# Patient Record
Sex: MALE | Race: ASIAN | NOT HISPANIC OR LATINO | URBAN - METROPOLITAN AREA
[De-identification: names, ages, dates, MRNs, and addresses within clinical notes are randomized per-mention and may not be internally consistent; named-entity substitution may affect disease eponyms.]

---
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
## 2024-08-23 ENCOUNTER — APPOINTMENT (OUTPATIENT)
Dept: PRIMARY CARE | Facility: CLINIC | Age: 19
End: 2024-08-23
Payer: COMMERCIAL

## 2024-08-23 VITALS
HEIGHT: 69 IN | SYSTOLIC BLOOD PRESSURE: 121 MMHG | WEIGHT: 145 LBS | HEART RATE: 80 BPM | DIASTOLIC BLOOD PRESSURE: 84 MMHG | BODY MASS INDEX: 21.48 KG/M2

## 2024-08-23 DIAGNOSIS — Z00.00 WELL ADULT HEALTH CHECK: ICD-10-CM

## 2024-08-23 DIAGNOSIS — F41.9 ANXIETY: ICD-10-CM

## 2024-08-23 DIAGNOSIS — Z23 NEED FOR INFLUENZA VACCINATION: ICD-10-CM

## 2024-08-23 DIAGNOSIS — R19.7 DIARRHEA, UNSPECIFIED TYPE: ICD-10-CM

## 2024-08-23 DIAGNOSIS — R14.0 POSTPRANDIAL ABDOMINAL BLOATING: Primary | ICD-10-CM

## 2024-08-23 PROBLEM — J02.0 STREP PHARYNGITIS: Status: ACTIVE | Noted: 2024-08-23

## 2024-08-23 PROBLEM — K30 GASTROINTESTINAL DISCOMFORT: Status: ACTIVE | Noted: 2022-08-16

## 2024-08-23 PROBLEM — R50.9 FEVER: Status: ACTIVE | Noted: 2024-08-23

## 2024-08-23 PROCEDURE — 3008F BODY MASS INDEX DOCD: CPT | Performed by: INTERNAL MEDICINE

## 2024-08-23 PROCEDURE — 99203 OFFICE O/P NEW LOW 30 MIN: CPT | Performed by: INTERNAL MEDICINE

## 2024-08-23 PROCEDURE — 1036F TOBACCO NON-USER: CPT | Performed by: INTERNAL MEDICINE

## 2024-08-23 RX ORDER — PANTOPRAZOLE SODIUM 40 MG/1
40 TABLET, DELAYED RELEASE ORAL DAILY
Qty: 60 TABLET | Refills: 0 | Status: SHIPPED | OUTPATIENT
Start: 2024-08-23

## 2024-08-23 ASSESSMENT — PATIENT HEALTH QUESTIONNAIRE - PHQ9
1. LITTLE INTEREST OR PLEASURE IN DOING THINGS: NOT AT ALL
SUM OF ALL RESPONSES TO PHQ9 QUESTIONS 1 AND 2: 0
2. FEELING DOWN, DEPRESSED OR HOPELESS: NOT AT ALL

## 2024-08-23 NOTE — PROGRESS NOTES
"Assessment/Plan   Problem List Items Addressed This Visit       Postprandial abdominal bloating - Primary    Relevant Medications    pantoprazole (ProtoNix) 40 mg EC tablet    Other Relevant Orders    CBC    Comprehensive Metabolic Panel    TSH with reflex to Free T4 if abnormal    Stool Pathogen Panel, PCR    C. difficile, PCR    H. pylori antigen, stool    Diarrhea    Anxiety     Other Visit Diagnoses       Well adult health check        Relevant Orders    CBC    Comprehensive Metabolic Panel        He wanted flu shot that will be given  We discussed optimal time in September   Labs as ordered  Stool test  Use Protonix and see how he responds  And then advise subsequently    Subjective   Patient ID: Wily Castaneda is a 19 y.o. male who presents for Establish Care (Patient states that he has been having stomach issues for about 1-2 years.  States that he thinks that it is IBS that comes and goes.  Stomach issues occur after eating at time and feels bloated with \"mushy bowels\".  ).    Past Surgical History:   Procedure Laterality Date    FRACTURE SURGERY  2014    HAND SURGERY Left       Family History   Problem Relation Name Age of Onset    Hypertension Father Teo Castaneda     Diabetes Paternal Grandfather Anuel Castaneda       Social History     Socioeconomic History    Marital status: Single     Spouse name: Not on file    Number of children: Not on file    Years of education: Not on file    Highest education level: Not on file   Occupational History    Not on file   Tobacco Use    Smoking status: Never    Smokeless tobacco: Never   Substance and Sexual Activity    Alcohol use: Never    Drug use: Never    Sexual activity: Never   Other Topics Concern    Not on file   Social History Narrative    Not on file     Social Determinants of Health     Financial Resource Strain: Not on file   Food Insecurity: Not on file   Transportation Needs: Not on file   Physical Activity: Not on file   Stress: Not on file   Social Connections: Not " "on file   Intimate Partner Violence: Not on file   Housing Stability: Not on file      Patient has no known allergies.   Current Outpatient Medications   Medication Sig Dispense Refill    pantoprazole (ProtoNix) 40 mg EC tablet Take 1 tablet (40 mg) by mouth once daily. Do not crush, chew, or split. 60 tablet 0     No current facility-administered medications for this visit.      Vitals:    08/23/24 1143   BP: 121/84   BP Location: Left arm   Patient Position: Sitting   Pulse: 80   Weight: 65.8 kg (145 lb)   Height: 1.753 m (5' 9\")      Problem List Items Addressed This Visit       Postprandial abdominal bloating - Primary    Relevant Medications    pantoprazole (ProtoNix) 40 mg EC tablet    Other Relevant Orders    CBC    Comprehensive Metabolic Panel    TSH with reflex to Free T4 if abnormal    Stool Pathogen Panel, PCR    C. difficile, PCR    H. pylori antigen, stool    Diarrhea    Anxiety     Other Visit Diagnoses       Well adult health check        Relevant Orders    CBC    Comprehensive Metabolic Panel           Orders Placed This Encounter   Procedures    Stool Pathogen Panel, PCR     Order Specific Question:   Release result to MyChart     Answer:   Immediate [1]    C. difficile, PCR     Order Specific Question:   Release result to MyChart     Answer:   Immediate [1]    CBC     Standing Status:   Future     Standing Expiration Date:   8/23/2025     Order Specific Question:   Release result to MyChart     Answer:   Immediate    Comprehensive Metabolic Panel     Standing Status:   Future     Standing Expiration Date:   8/23/2025     Order Specific Question:   Release result to MyChart     Answer:   Immediate    TSH with reflex to Free T4 if abnormal     Standing Status:   Future     Standing Expiration Date:   8/23/2025     Order Specific Question:   Release result to MyChart     Answer:   Immediate    H. pylori antigen, stool     Standing Status:   Future     Standing Expiration Date:   8/23/2025     Order " "Specific Question:   Release result to [facility]     Answer:   Immediate [1]        HPI  This is a very [place] and 19-year-old young male who is currently studying computer science and math at [employer]  He has been having the feeling of anxiety while flying  Has had bowel upset for last 2 to 3 years  He has postprandial bloating he has  Mushy stool and diarrhea-like features intermittently he has not lost weight there is no palpitation  He does not take nonsteroidal anti-inflammatory drug    ROS otherwise has been negative  Past medical history nonsignificant  Social history as recorded  Family history noncontributory  Allergies and medication as recorded    PHYSICAL EXAM  Cardiovascular chest abdominal neurological examination normal      No results found for: \"PR1\", \"BMPR1A\", \"CMPLAS\", \"GC5VKFRR\", \"KPSAT\"   No results found for: \"CHOL\", \"LDLCALC\", \"HDLC\", \"LCTRG\", \"CHHDL\"             "

## 2024-08-26 ENCOUNTER — LAB (OUTPATIENT)
Dept: LAB | Facility: LAB | Age: 19
End: 2024-08-26
Payer: COMMERCIAL

## 2024-08-26 DIAGNOSIS — Z00.00 WELL ADULT HEALTH CHECK: ICD-10-CM

## 2024-08-26 DIAGNOSIS — R14.0 POSTPRANDIAL ABDOMINAL BLOATING: ICD-10-CM

## 2024-08-26 LAB
ALBUMIN SERPL BCP-MCNC: 4.3 G/DL (ref 3.4–5)
ALP SERPL-CCNC: 69 U/L (ref 33–120)
ALT SERPL W P-5'-P-CCNC: 11 U/L (ref 10–52)
ANION GAP SERPL CALC-SCNC: 12 MMOL/L (ref 10–20)
AST SERPL W P-5'-P-CCNC: 16 U/L (ref 9–39)
BILIRUB SERPL-MCNC: 0.9 MG/DL (ref 0–1.2)
BUN SERPL-MCNC: 11 MG/DL (ref 6–23)
CALCIUM SERPL-MCNC: 9.2 MG/DL (ref 8.6–10.3)
CHLORIDE SERPL-SCNC: 104 MMOL/L (ref 98–107)
CO2 SERPL-SCNC: 29 MMOL/L (ref 21–32)
CREAT SERPL-MCNC: 1 MG/DL (ref 0.5–1.3)
EGFRCR SERPLBLD CKD-EPI 2021: >90 ML/MIN/1.73M*2
ERYTHROCYTE [DISTWIDTH] IN BLOOD BY AUTOMATED COUNT: 12.2 % (ref 11.5–14.5)
GLUCOSE SERPL-MCNC: 74 MG/DL (ref 74–99)
HCT VFR BLD AUTO: 45.4 % (ref 41–52)
HGB BLD-MCNC: 15 G/DL (ref 13.5–17.5)
MCH RBC QN AUTO: 31.6 PG (ref 26–34)
MCHC RBC AUTO-ENTMCNC: 33 G/DL (ref 32–36)
MCV RBC AUTO: 96 FL (ref 80–100)
NRBC BLD-RTO: 0 /100 WBCS (ref 0–0)
PLATELET # BLD AUTO: 230 X10*3/UL (ref 150–450)
POTASSIUM SERPL-SCNC: 3.9 MMOL/L (ref 3.5–5.3)
PROT SERPL-MCNC: 6.6 G/DL (ref 6.4–8.2)
RBC # BLD AUTO: 4.75 X10*6/UL (ref 4.5–5.9)
SODIUM SERPL-SCNC: 141 MMOL/L (ref 136–145)
TSH SERPL-ACNC: 3.14 MIU/L (ref 0.44–3.98)
WBC # BLD AUTO: 9.4 X10*3/UL (ref 4.4–11.3)

## 2024-08-26 PROCEDURE — 84443 ASSAY THYROID STIM HORMONE: CPT

## 2024-08-26 PROCEDURE — 36415 COLL VENOUS BLD VENIPUNCTURE: CPT

## 2024-08-26 PROCEDURE — 80053 COMPREHEN METABOLIC PANEL: CPT

## 2024-08-26 PROCEDURE — 87449 NOS EACH ORGANISM AG IA: CPT

## 2024-08-26 PROCEDURE — 87506 IADNA-DNA/RNA PROBE TQ 6-11: CPT

## 2024-08-26 PROCEDURE — 85027 COMPLETE CBC AUTOMATED: CPT

## 2024-08-28 LAB
C COLI+JEJ+UPSA DNA STL QL NAA+PROBE: NOT DETECTED
EC STX1 GENE STL QL NAA+PROBE: NOT DETECTED
EC STX2 GENE STL QL NAA+PROBE: NOT DETECTED
H PYLORI AG STL QL IA: NEGATIVE
NOROVIRUS GI + GII RNA STL NAA+PROBE: NOT DETECTED
RV RNA STL NAA+PROBE: NOT DETECTED
SALMONELLA DNA STL QL NAA+PROBE: NOT DETECTED
SHIGELLA DNA SPEC QL NAA+PROBE: NOT DETECTED
V CHOLERAE DNA STL QL NAA+PROBE: NOT DETECTED
Y ENTEROCOL DNA STL QL NAA+PROBE: NOT DETECTED